# Patient Record
Sex: FEMALE | URBAN - METROPOLITAN AREA
[De-identification: names, ages, dates, MRNs, and addresses within clinical notes are randomized per-mention and may not be internally consistent; named-entity substitution may affect disease eponyms.]

---

## 2023-10-21 ENCOUNTER — LAB REQUISITION (OUTPATIENT)
Dept: LAB | Facility: HOSPITAL | Age: 43
End: 2023-10-21
Payer: COMMERCIAL

## 2023-10-21 DIAGNOSIS — L29.8 OTHER PRURITUS: ICD-10-CM

## 2023-10-21 PROCEDURE — 83520 IMMUNOASSAY QUANT NOS NONAB: CPT

## 2023-10-24 LAB — TRYPTASE SERPL-MCNC: 16.2 UG/L

## 2025-05-18 ENCOUNTER — ANCILLARY PROCEDURE (OUTPATIENT)
Dept: URGENT CARE | Age: 45
End: 2025-05-18
Payer: COMMERCIAL

## 2025-05-18 ENCOUNTER — OFFICE VISIT (OUTPATIENT)
Dept: URGENT CARE | Age: 45
End: 2025-05-18
Payer: COMMERCIAL

## 2025-05-18 VITALS
WEIGHT: 237 LBS | TEMPERATURE: 98.9 F | RESPIRATION RATE: 16 BRPM | SYSTOLIC BLOOD PRESSURE: 109 MMHG | DIASTOLIC BLOOD PRESSURE: 76 MMHG | OXYGEN SATURATION: 98 % | HEART RATE: 90 BPM

## 2025-05-18 DIAGNOSIS — S99.912A INJURY OF LEFT ANKLE, INITIAL ENCOUNTER: Primary | ICD-10-CM

## 2025-05-18 DIAGNOSIS — S93.402A SPRAIN OF LEFT ANKLE, UNSPECIFIED LIGAMENT, INITIAL ENCOUNTER: ICD-10-CM

## 2025-05-18 DIAGNOSIS — S99.912A INJURY OF LEFT ANKLE, INITIAL ENCOUNTER: ICD-10-CM

## 2025-05-18 PROCEDURE — 73610 X-RAY EXAM OF ANKLE: CPT | Mod: LEFT SIDE | Performed by: NURSE PRACTITIONER

## 2025-05-18 PROCEDURE — 1036F TOBACCO NON-USER: CPT | Performed by: NURSE PRACTITIONER

## 2025-05-18 PROCEDURE — 99203 OFFICE O/P NEW LOW 30 MIN: CPT | Performed by: NURSE PRACTITIONER

## 2025-05-18 RX ORDER — CYCLOBENZAPRINE HCL 10 MG
10 TABLET ORAL NIGHTLY PRN
Qty: 30 TABLET | Refills: 0 | Status: SHIPPED | OUTPATIENT
Start: 2025-05-18 | End: 2025-07-17

## 2025-05-18 RX ORDER — DICLOFENAC SODIUM 10 MG/G
4 GEL TOPICAL 4 TIMES DAILY
Qty: 100 G | Refills: 0 | Status: SHIPPED | OUTPATIENT
Start: 2025-05-18

## 2025-05-18 RX ORDER — METHYLPREDNISOLONE 4 MG/1
TABLET ORAL
Qty: 21 TABLET | Refills: 0 | Status: SHIPPED | OUTPATIENT
Start: 2025-05-18 | End: 2025-05-24

## 2025-05-18 ASSESSMENT — PATIENT HEALTH QUESTIONNAIRE - PHQ9
2. FEELING DOWN, DEPRESSED OR HOPELESS: NOT AT ALL
SUM OF ALL RESPONSES TO PHQ9 QUESTIONS 1 AND 2: 0
1. LITTLE INTEREST OR PLEASURE IN DOING THINGS: NOT AT ALL

## 2025-05-18 ASSESSMENT — ENCOUNTER SYMPTOMS
JOINT SWELLING: 1
ARTHRALGIAS: 1

## 2025-05-18 NOTE — PATIENT INSTRUCTIONS
Stop oxaprozin while taking Medrol Dosepak. Resume oxprozin after finishing Medrol Dosepak.  Take all medications as instructed.  Rest, ice, elevation and compression discussed.  Take Tylenol as needed for aches and pain.  Pain should improve in 4-6weeks.  Referred to orthopedist.  If symptoms do not improve, advised to return and/or follow-up with PCP.  Call 911 or go to the nearest ER if the symptoms worsen.

## 2025-05-18 NOTE — PROGRESS NOTES
Subjective   Patient ID: Na Smith is a 44 y.o. female. They present today with a chief complaint of Ankle Sprain (LT- yesterday after falling over a pothole).    History of Present Illness    History provided by:  Patient   used: No    Injury  Location:  Left ankle  Quality:  Achiness  Severity:  Moderate  Onset quality:  Sudden  Duration:  1 day  Timing:  Constant  Progression:  Worsening  Chronicity:  New  Context:  Patient was in the parking lot of [employer] yesterday when she stepped into a pothole with the left foot.  The pain and swelling in the left ankle started right away.  Relieved by:  Nothing  Worsened by:  Weightbearing  Ineffective treatments:  Oxaprozin      Past Medical History  Allergies as of 05/18/2025 - Reviewed 05/18/2025   Allergen Reaction Noted    Amoxicillin Anaphylaxis and Other 06/28/2004       Prescriptions Prior to Admission[1]     Medical History[2]    Surgical History[3]     reports that she has never smoked. She has never been exposed to tobacco smoke. She has never used smokeless tobacco. She reports that she does not drink alcohol and does not use drugs.    Review of Systems  Review of Systems   Musculoskeletal:  Positive for arthralgias and joint swelling.                                  Objective    Vitals:    05/18/25 0937   BP: 109/76   Pulse: 90   Resp: 16   Temp: 37.2 °C (98.9 °F)   TempSrc: Oral   SpO2: 98%   Weight: 108 kg (237 lb)     Patient's last menstrual period was 05/11/2025.    Physical Exam  Vitals and nursing note reviewed.   Constitutional:       Appearance: Normal appearance.   HENT:      Head: Normocephalic and atraumatic.   Cardiovascular:      Rate and Rhythm: Normal rate and regular rhythm.   Pulmonary:      Effort: Pulmonary effort is normal.      Breath sounds: Normal breath sounds.   Musculoskeletal:      Left ankle: Swelling present. No ecchymosis or lacerations. Tenderness present over the lateral malleolus and medial  malleolus. Decreased range of motion. Normal pulse.      Left Achilles Tendon: Normal.      Comments: Left lower extremity neurovascularly intact.   Neurological:      Mental Status: She is alert.         Procedures    Point of Care Test & Imaging Results from this visit  No results found for this visit on 05/18/25.   Imaging  XR ankle left 3+ views  Result Date: 5/18/2025  Moderate soft tissue swelling overlying the lateral malleolus without underlying acute osseous abnormality.     MACRO: None   Signed by: Jeison Madera 5/18/2025 10:40 AM Dictation workstation:   [id number]      Cardiology, Vascular, and Other Imaging  No other imaging results found for the past 2 days      Diagnostic study results (if any) were reviewed by HARVINDER Warner.    Assessment/Plan   Allergies, medications, history, and pertinent labs/EKGs/Imaging reviewed by HARVINDER Warner.     Medical Decision Making  Negative x-ray.  Given the symptoms and injury, there is a high suspicion of left ankle sprain  Stop oxaprozin while taking Medrol Dosepak. Resume oxprozin after finishing Medrol Dosepak.  Take all medications as instructed.  Rest, ice, elevation and compression discussed.  Take Tylenol as needed for aches and pain.  Pain should improve in 4-6 weeks.  Referred to orthopedist.  If symptoms do not improve, advised to return and/or follow-up with PCP.  Call 911 or go to the nearest ER if the symptoms worsen.  Patient verbalized understanding of these instructions and left in stable condition.    Orders and Diagnoses  Diagnoses and all orders for this visit:  Injury of left ankle, initial encounter  -     XR ankle left 3+ views; Future  -     methylPREDNISolone (Medrol Dospak) 4 mg tablets; Take as directed on package.  -     cyclobenzaprine (Flexeril) 10 mg tablet; Take 1 tablet (10 mg) by mouth as needed at bedtime for muscle spasms.  -     diclofenac sodium (Voltaren) 1 % gel; Apply 4.5 inches (4 g) topically 4 times a  day.  -     Referral to Orthopedics and Sports Medicine; Future      Medical Admin Record      Patient disposition: Home    Electronically signed by HARVINDER Warner  10:53 AM           [1] (Not in a hospital admission)   [2] History reviewed. No pertinent past medical history.  [3] History reviewed. No pertinent surgical history.